# Patient Record
(demographics unavailable — no encounter records)

---

## 2025-01-28 NOTE — HISTORY OF PRESENT ILLNESS
[de-identified] : nasal congestion [FreeTextEntry6] : nasal congestion for 2 days now cough. no fever  cough is dry thick nasal discharge

## 2025-02-14 NOTE — HISTORY OF PRESENT ILLNESS
[de-identified] : runny nose [FreeTextEntry6] : patient with nasal congestion and cough for 2 days low grade fever  clear runny nose

## 2025-02-18 NOTE — DISCUSSION/SUMMARY
[FreeTextEntry1] : Recommend supportive care including antipyretics, fluids, and nasal saline followed by nasal suction. Discussed risks/benefits of Tamiflu. fluids

## 2025-02-18 NOTE — HISTORY OF PRESENT ILLNESS
[GI Symptoms] : GI SYMPTOMS [Vomiting] : vomiting [Diarrhea] : diarrhea [Abdominal Pain] : abdominal pain [de-identified] : fever  [FreeTextEntry6] : congestion and cough and runny nose  tmax 103 dcerease appetite  states headahe and feeling weak as well

## 2025-02-18 NOTE — HISTORY OF PRESENT ILLNESS
[GI Symptoms] : GI SYMPTOMS [Vomiting] : vomiting [Diarrhea] : diarrhea [Abdominal Pain] : abdominal pain [de-identified] : fever  [FreeTextEntry6] : congestion and cough and runny nose  tmax 103 dcerease appetite  states headahe and feeling weak as well

## 2025-02-20 NOTE — HISTORY OF PRESENT ILLNESS
[FreeTextEntry6] : follow up on child having the flu and has a raspy cough cough has worsened no more fever no chest pain no vomiting drining and eating better

## 2025-02-20 NOTE — DISCUSSION/SUMMARY
[FreeTextEntry1] : Start antibiotic treatments if worsens or SOB or chest pain to call back albuterol every 6 hours fluids if worsens or distress to come back 
[FreeTextEntry1] : Start antibiotic treatments if worsens or SOB or chest pain to call back albuterol every 6 hours fluids if worsens or distress to come back 
Female

## 2025-04-03 NOTE — HISTORY OF PRESENT ILLNESS
[EENT/Resp Symptoms] : EENT/RESPIRATORY SYMPTOMS [Runny nose] : runny nose [Nasal congestion] : nasal congestion [Chest congestion] : chest congestion [Cough] : cough [___ Day(s)] : [unfilled] day(s) [Constant] : constant [Active] : active [Recent swimming] : no recent swimming [Known Exposure to COVID-19] : no known exposure to COVID-19 [History of recent COVID-19 infection] : no history of recent COVID-19 infection [Sick Contacts: ___] : no sick contacts [Dry cough] : dry cough [Barking cough] : barking cough [At Night] : at night [OTC Cough/Cold Preparations] : OTC cough/cold preparations [Fever] : no fever [Malaise] : no malaise [Eye Redness] : no eye redness [Eye Discharge] : no eye discharge [Eye Itching] : no eye itching [Ear Pain] : no ear pain [Runny Nose] : runny nose [Nasal Congestion] : nasal congestion [Sore Throat] : no sore throat [Palpitations] : no palpitations [Chest Pain] : no chest pain [Wheezing] : no wheezing [SOB] : no shortness of breath [Tachypnea] : no tachypnea [Decreased Appetite] : no decreased appetite [Posttussive emesis] : no posttussive emesis [Vomiting] : no vomiting [Diarrhea] : no diarrhea [Decreased Urine Output] : no decreased urine output [Rash] : no rash [Myalgia] : no myalgia [Loss of taste] : no loss of taste [Loss of smell] : no loss of smell [Headache] : no headache [Stable] : stable

## 2025-04-29 NOTE — HISTORY OF PRESENT ILLNESS
[EENT/Resp Symptoms] : EENT/RESPIRATORY SYMPTOMS [Nasal congestion] : nasal congestion [___ Day(s)] : [unfilled] day(s) [Sick Contacts: ___] : sick contacts: [unfilled] [At Night] : at night [Change in sleep] : change in sleep [Runny Nose] : runny nose [Nasal Congestion] : nasal congestion [Cough] : cough [SOB] : shortness of breath [Known Exposure to COVID-19] : no known exposure to COVID-19 [History of recent COVID-19 infection] : no history of recent COVID-19 infection [Wet cough] : wet cough [Fever] : no fever [Malaise] : no malaise [Eye Redness] : no eye redness [Eye Discharge] : no eye discharge [Eye Itching] : no eye itching [Ear Pain] : no ear pain [Sore Throat] : no sore throat [Palpitations] : no palpitations [Chest Pain] : no chest pain [Wheezing] : no wheezing [Tachypnea] : no tachypnea [Decreased Appetite] : no decreased appetite [Posttussive emesis] : no posttussive emesis [Vomiting] : no vomiting [Diarrhea] : no diarrhea [Decreased Urine Output] : no decreased urine output [Rash] : no rash [Myalgia] : no myalgia [Loss of taste] : no loss of taste [Loss of smell] : no loss of smell [Headache] : no headache [FreeTextEntry4] : Dimetapp